# Patient Record
Sex: FEMALE | Race: WHITE | HISPANIC OR LATINO | Employment: UNEMPLOYED | URBAN - METROPOLITAN AREA
[De-identification: names, ages, dates, MRNs, and addresses within clinical notes are randomized per-mention and may not be internally consistent; named-entity substitution may affect disease eponyms.]

---

## 2018-11-07 ENCOUNTER — OFFICE VISIT (OUTPATIENT)
Dept: FAMILY MEDICINE CLINIC | Facility: CLINIC | Age: 33
End: 2018-11-07
Payer: COMMERCIAL

## 2018-11-07 VITALS
OXYGEN SATURATION: 95 % | DIASTOLIC BLOOD PRESSURE: 60 MMHG | RESPIRATION RATE: 18 BRPM | WEIGHT: 194 LBS | SYSTOLIC BLOOD PRESSURE: 118 MMHG | TEMPERATURE: 97.1 F | HEART RATE: 100 BPM | HEIGHT: 66 IN | BODY MASS INDEX: 31.18 KG/M2

## 2018-11-07 DIAGNOSIS — S66.911A MUSCLE STRAIN OF RIGHT WRIST, INITIAL ENCOUNTER: Primary | ICD-10-CM

## 2018-11-07 PROCEDURE — 3008F BODY MASS INDEX DOCD: CPT | Performed by: PHYSICIAN ASSISTANT

## 2018-11-07 PROCEDURE — 99203 OFFICE O/P NEW LOW 30 MIN: CPT | Performed by: PHYSICIAN ASSISTANT

## 2018-11-07 PROCEDURE — 3725F SCREEN DEPRESSION PERFORMED: CPT | Performed by: PHYSICIAN ASSISTANT

## 2018-11-07 RX ORDER — IBUPROFEN 800 MG/1
800 TABLET ORAL EVERY 6 HOURS PRN
Qty: 60 TABLET | Refills: 0 | Status: SHIPPED | OUTPATIENT
Start: 2018-11-07
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
## 2018-11-07 NOTE — PATIENT INSTRUCTIONS
Wrist Sprain   WHAT YOU NEED TO KNOW:   What is a wrist sprain? A wrist sprain happens when one or more ligaments in your wrist stretch or tear  Ligaments are tough tissues that connect bones and keep them in place, and support your joints  A fall onto your outstretched hand can cause a wrist sprain  An injury that causes your wrist to twist can also cause a sprain  This may happen during sports, such as biking, skiing, or snowboarding  What are the signs and symptoms of a wrist sprain? · Swelling and tenderness    · Pain and stiffness     · Bruising or changes in skin color    · Popping sound in your wrist when you move it  How is a wrist sprain diagnosed? Your healthcare provider will ask how you injured your wrist  The provider will examine your wrist and hand and ask about your symptoms  You may need x-rays, an MRI, or a CT scan of your wrist  You may be given contrast liquid to help the pictures show up better  Tell the healthcare provider if you have ever had an allergic reaction to contrast liquid  Do not enter the MRI room with anything metal  Metal can cause serious injury  Tell the healthcare provider if you have any metal in or on your body  How is a wrist sprain treated? Treatment depends on how severe your sprain is  You may need any of the following:  · NSAIDs , such as ibuprofen, help decrease swelling, pain, and fever  NSAIDs can cause stomach bleeding or kidney problems in certain people  If you take blood thinner medicine, always ask your healthcare provider if NSAIDs are safe for you  Always read the medicine label and follow directions  · Acetaminophen  decreases pain and fever  It is available without a doctor's order  Ask how much to take and how often to take it  Follow directions  Read the labels of all other medicines you are using to see if they also contain acetaminophen, or ask your doctor or pharmacist  Acetaminophen can cause liver damage if not taken correctly   Do not use more than 4 grams (4,000 milligrams) total of acetaminophen in one day  · A splint or cast  helps support your wrist and prevent more damage  · Surgery  may be needed if you have a severe sprain  Arthroscopy may be done to examine the inside of your wrist joint and repair ligament damage  Arthroscopy uses a scope that is inserted through a small incision  You may need open surgery to reconnect torn ligaments to the bone  · Physical therapy  may be recommended  A physical therapist teaches you exercises to help improve movement and strength, and to decrease pain  How can I manage my symptoms? · Rest  your wrist for at least 48 hours  Avoid activities that cause pain  · Ice  your wrist for 15 to 20 minutes every hour or as directed  Use an ice pack, or put crushed ice in a plastic bag  Cover it with a towel before you put it on your wrist  Ice helps prevent tissue damage and decreases swelling and pain  · Compress  your wrist with an elastic bandage  This will help decrease swelling, support your wrist, and help it heal  Wear your wrist wrap as directed  The elastic bandage should be snug but not tight  · Elevate  your wrist above the level of your heart as often as you can  This will help decrease swelling and pain  Prop your wrist on pillows or blankets to keep it elevated comfortably  When should I seek immediate care? · You have severe pain or swelling  · Your injured wrist is red or has red streaks spreading from the injured area  · You have new trouble moving your hands, fingers, or wrist     · Your wrist, hand, or fingers feel cold or numb  · Your fingernails turn blue or gray  When should I contact my healthcare provider? · Your symptoms get worse  · Your sprain does not get better within 2 weeks  · You have questions or concerns about your condition or care  CARE AGREEMENT:   You have the right to help plan your care   Learn about your health condition and how it may be treated  Discuss treatment options with your caregivers to decide what care you want to receive  You always have the right to refuse treatment  The above information is an  only  It is not intended as medical advice for individual conditions or treatments  Talk to your doctor, nurse or pharmacist before following any medical regimen to see if it is safe and effective for you  © 2017 [employer]  Information is for End User's use only and may not be sold, redistributed or otherwise used for commercial purposes  All illustrations and images included in CareNotes® are the copyrighted property of A D A M [employer] Inc  or [employer]ir [employer]

## 2021-02-13 ENCOUNTER — HOSPITAL ENCOUNTER (EMERGENCY)
Facility: HOSPITAL | Age: 36
Discharge: HOME | End: 2021-02-13
Attending: EMERGENCY MEDICINE | Admitting: EMERGENCY MEDICINE
Payer: COMMERCIAL

## 2021-02-13 VITALS
RESPIRATION RATE: 18 BRPM | HEART RATE: 61 BPM | DIASTOLIC BLOOD PRESSURE: 64 MMHG | SYSTOLIC BLOOD PRESSURE: 132 MMHG | OXYGEN SATURATION: 100 % | TEMPERATURE: 97.4 F

## 2021-02-13 DIAGNOSIS — K08.89 PAIN, DENTAL: Primary | ICD-10-CM

## 2021-02-13 PROCEDURE — 96372 THER/PROPH/DIAG INJ SC/IM: CPT

## 2021-02-13 PROCEDURE — 99282 EMERGENCY DEPT VISIT SF MDM: CPT

## 2021-02-13 PROCEDURE — 99284 EMERGENCY DEPT VISIT MOD MDM: CPT | Performed by: EMERGENCY MEDICINE

## 2021-02-13 RX ORDER — KETOROLAC TROMETHAMINE 30 MG/ML
15 INJECTION, SOLUTION INTRAMUSCULAR; INTRAVENOUS ONCE
Status: COMPLETED | OUTPATIENT
Start: 2021-02-13 | End: 2021-02-13

## 2021-02-13 RX ORDER — PENICILLIN V POTASSIUM 500 MG/1
500 TABLET ORAL 4 TIMES DAILY
Qty: 28 TABLET | Refills: 0 | Status: SHIPPED | OUTPATIENT
Start: 2021-02-13 | End: 2021-02-20

## 2021-02-13 RX ORDER — CHLORHEXIDINE GLUCONATE 0.12 MG/ML
15 RINSE ORAL 2 TIMES DAILY
Qty: 120 ML | Refills: 0 | Status: SHIPPED | OUTPATIENT
Start: 2021-02-13

## 2021-02-13 RX ADMIN — KETOROLAC TROMETHAMINE 15 MG: 30 INJECTION, SOLUTION INTRAMUSCULAR at 05:48

## 2021-02-13 NOTE — Clinical Note
Adma Rios was seen and treated in our emergency department on 2/13/2021  Diagnosis:     Belen Reeves  may return to work on return date  She may return on this date: 02/15/2021         If you have any questions or concerns, please don't hesitate to call        Sharri Alvarez MD    ______________________________           _______________          _______________  Hospital Representative                              Date                                Time

## 2021-02-13 NOTE — ED ATTENDING ATTESTATION
Final Diagnosis:  1  Pain, dental           I, Mary Sin MD, saw and evaluated the patient  All available labs and X-rays were ordered by me or the resident and have been reviewed by myself  I discussed the patient with the resident / non-physician and agree with the resident's / non-physician practitioner's findings and plan as documented in the resident's / non-physician practicitioner's note, except where noted  At this point, I agree with the current assessment done in the ED  I was present during key portions of all procedures performed unless otherwise stated  Chief Complaint   Patient presents with    Dental Pain     tooth ache for a few days ago, worse tonight, upper left     This is a 30 y o  female presenting for evaluation of dental pain located around tooth 15/16  Motrin helps minimally  No f/ch/n/v/cp/sob  No trismus  Pain worse tonight so came in  Eating/drinking okay  Came in for T3 for pain  PMH:   has a past medical history of Anemia and Hypertension  PSH:   has a past surgical history that includes Lymph node biopsy;  section; and Tubal ligation  Social:  Social History     Substance and Sexual Activity   Alcohol Use Yes    Comment: Social     Social History     Tobacco Use   Smoking Status Current Every Day Smoker    Packs/day: 1 00   Smokeless Tobacco Never Used     Social History     Substance and Sexual Activity   Drug Use Yes    Types: Marijuana     PE:  Vitals:    21 0513   Pulse: 61   Resp: 18   Temp: (!) 97 4 °F (36 3 °C)   TempSrc: Oral   SpO2: 100%   General: VS reviewed  Appears in NAD  awake, alert  Well-nourished, well-developed  Appears stated age  Speaking normally in full sentences  Head: Normocephalic, atraumatic  Eyes: EOM-I  No diplopia  No hyphema  No subconjunctival hemorrhages  Symmetrical lids  ENT: Atraumatic external nose and ears  MMM  Minimal LEFT facial swelling  Obvious cracked tooth, around tooth 15  Tooth 16 extracted  Percussive tenderness present  +ginigivitis (mild)  No trismus  No foul smell but I am wearing an n95 mask  No malocclusion  No stridor  Normal phonation  No drooling  Normal swallowing  Neck: No JVD  CV: No pallor noted  Lungs:   No tachypnea  No respiratory distress  MSK:   FROM spontaneously  Skin: Dry, intact  Neuro: Awake, alert, GCS15, CN II-XII grossly intact  Motor grossly intact  Psychiatric/Behavioral: Appropriate mood and affect   Exam: deferred  A:  - Dentalgia  P:  - We will give tylenol/motrin for pain  - discussed orajel  - We will write for chlorhexidine to sterilize the area and prevent further worsening of infection or repeat infection   - Given the appearance, we will also do antibiotics:  [ x ] PCN 500mg QID  [    ] Amoxicillin 20-40mg/kg/day TID  [    ] Augmentin 20mg/kg BID  [    ] Clindamycin (PCN allergy or worsening infection after 72 hours of amoxicillin) 8-20mg/kg/day TID  [    ] Azithromycin (alternative to clindamycin) 5-12mg/kg Qday  [    ] Metronidazole (30mg/kg/day QID) + Amoxicillin if suspected resistant infection  - The patient will follow-up with her primary care or dentist for re-evaluation of her symptoms   - The patient has no red flags for Darrian's or serious dental infection at this juncture  The patient doesn't appear toxic, nor has rapid progression of symptoms  Patient isn't immunocompromised  Patient has no SOB nor trouble swallowing  Patient doesn't appear dehydrated nor demonstrates trismus on exam    - 13 point ROS was performed and all are normal unless stated in the history above  - Nursing note reviewed  Vitals reviewed  - Orders placed by myself and/or advanced practitioner / resident     - Previous chart was reviewed  - No language barrier    - History obtained from patient  - There are no limitations to the history obtained  - Critical care time: Not applicable for this patient       Code Status: No Order  Advance Directive and Living Will:      Power of :    POLST:      Medications   ketorolac (TORADOL) injection 15 mg (has no administration in time range)     No orders to display     No orders of the defined types were placed in this encounter  Labs Reviewed - No data to display  Time reflects when diagnosis was documented in both MDM as applicable and the Disposition within this note     Time User Action Codes Description Comment    2/13/2021  5:33 AM Manoj Chauhan Add [K08 89] Pain, dental       ED Disposition     ED Disposition Condition Date/Time Comment    Discharge Stable Sat Feb 13, 2021  5:33 AM Almon Border discharge to home/self care  Follow-up Information     Follow up With Specialties Details Why Contact Info Additional Information    [address] [place] [address] 34 [facility] Emergency Department Emergency Medicine   [address]  [place] [address]  95 [facility] 64 [facility] Emergency Department, [address], [place], [place], [address]   [phone]        Patient's Medications   Discharge Prescriptions    CHLORHEXIDINE (PERIDEX) 0 12 % SOLUTION    Apply 15 mL to the mouth or throat 2 (two) times a day       Start Date: 2/13/2021 End Date: --       Order Dose: 15 mL       Quantity: 120 mL    Refills: 0    PENICILLIN V POTASSIUM (VEETID) 500 MG TABLET    Take 1 tablet (500 mg total) by mouth 4 (four) times a day for 7 days       Start Date: 2/13/2021 End Date: 2/20/2021       Order Dose: 500 mg       Quantity: 28 tablet    Refills: 0     No discharge procedures on file  Prior to Admission Medications   Prescriptions Last Dose Informant Patient Reported? Taking?   ibuprofen (MOTRIN) 800 mg tablet   No No   Sig: Take 1 tablet (800 mg total) by mouth every 6 (six) hours as needed for mild pain      Facility-Administered Medications: None       Portions of the record may have been created with voice recognition software   Occasional wrong word or "sound a like" substitutions may have occurred due to the inherent limitations of voice recognition software  Read the chart carefully and recognize, using context, where substitutions have occurred      Electronically signed by:  Diana Moffett

## 2021-02-13 NOTE — ED PROVIDER NOTES
Final Diagnosis:  1  Pain, dental        Chief Complaint   Patient presents with    Dental Pain     tooth ache for a few days ago, worse tonight, upper left     HPI  Patient presents for evaluation of dental pain  Patient states that for the last 3 4 days she has had pain in her left upper molar  She states the pain has been getting progressively worsened now she feels like it radiates back into her ear  It is worse with palpation or chilling  Mildly improved with ibuprofen  Patient states that she has attempted to contact her dentist for removal but has been unable to do so  Patient denies any fever chills, difficulty handling oral secretions, change in voice, difficulty breathing     - No language barrier    - History obtained from patient  - There are no limitations to the history obtained  - Previous charting was reviewed    PMH:   has a past medical history of Anemia and Hypertension  PSH:   has a past surgical history that includes Lymph node biopsy;  section; and Tubal ligation  ROS:  Review of Systems   Constitutional: Negative for chills, diaphoresis, fatigue and fever  HENT: Positive for dental problem  Negative for drooling, facial swelling, mouth sores, sore throat, trouble swallowing and voice change  Respiratory: Negative for cough and shortness of breath  Cardiovascular: Negative for chest pain and palpitations  Gastrointestinal: Negative for abdominal distention, abdominal pain, constipation, diarrhea, nausea and vomiting  Genitourinary: Negative for dysuria, frequency and hematuria  Musculoskeletal: Negative for arthralgias, myalgias and neck pain  Neurological: Negative for dizziness, syncope, light-headedness and headaches  All other systems reviewed and are negative  PE:   Vitals:    21 0513   Pulse: 61   Resp: 18   Temp: (!) 97 4 °F (36 3 °C)   TempSrc: Oral   SpO2: 100%     Vitals reviewed by me  Physical Exam  Vitals signs reviewed  Constitutional:       General: She is not in acute distress  Appearance: She is not diaphoretic  HENT:      Head: Normocephalic and atraumatic  Right Ear: External ear normal       Left Ear: External ear normal       Mouth/Throat:      Mouth: Mucous membranes are moist       Dentition: Abnormal dentition  Dental caries present  No gum lesions  Tongue: No lesions  Eyes:      General: No scleral icterus  Right eye: No discharge  Left eye: No discharge  Conjunctiva/sclera: Conjunctivae normal       Pupils: Pupils are equal, round, and reactive to light  Neck:      Musculoskeletal: Normal range of motion and neck supple  Vascular: No JVD  Cardiovascular:      Rate and Rhythm: Normal rate and regular rhythm  Heart sounds: Normal heart sounds  No murmur  No friction rub  No gallop  Pulmonary:      Effort: Pulmonary effort is normal  No respiratory distress  Breath sounds: Normal breath sounds  No wheezing or rales  Abdominal:      General: Bowel sounds are normal  There is no distension  Palpations: Abdomen is soft  There is no mass  Tenderness: There is no abdominal tenderness  There is no guarding  Musculoskeletal: Normal range of motion  General: No tenderness or deformity  Skin:     General: Skin is warm  Neurological:      Mental Status: She is alert and oriented to person, place, and time  Cranial Nerves: No cranial nerve deficit  Sensory: No sensory deficit  Motor: No abnormal muscle tone  Coordination: Coordination normal    Psychiatric:         Behavior: Behavior normal          Thought Content: Thought content normal          Judgment: Judgment normal           A:  - Nursing note reviewed  -                      No orders to display     No orders of the defined types were placed in this encounter  Labs Reviewed - No data to display      Final Diagnosis:  1   Pain, dental        P:  - patient with damaged molar  Discussed that she needs have this taking care of with a dentist   Provided antibiotics, Toradol, chlorhexidine, and information for walk-in dental clinic  -return precautions discussed    Medications   ketorolac (TORADOL) injection 15 mg (15 mg Intramuscular Given 2/13/21 0548)     Time reflects when diagnosis was documented in both MDM as applicable and the Disposition within this note     Time User Action Codes Description Comment    2/13/2021  5:33 AM Davina Schaeffer Add [K08 89] Pain, dental       ED Disposition     ED Disposition Condition Date/Time Comment    Discharge Stable Sat Feb 13, 2021  5:33 AM Tiffany Stewart discharge to home/self care  Follow-up Information     Follow up With Specialties Details Why Contact Info Additional Information    [address] [place] [address] Emergency Department Emergency Medicine   [address]  [place] [id number]  [address] [place] [address] Emergency Department, [address], [place], [address]   [phone]        Patient's Medications   Discharge Prescriptions    CHLORHEXIDINE (PERIDEX) 0 12 % SOLUTION    Apply 15 mL to the mouth or throat 2 (two) times a day       Start Date: 2/13/2021 End Date: --       Order Dose: 15 mL       Quantity: 120 mL    Refills: 0    PENICILLIN V POTASSIUM (VEETID) 500 MG TABLET    Take 1 tablet (500 mg total) by mouth 4 (four) times a day for 7 days       Start Date: 2/13/2021 End Date: 2/20/2021       Order Dose: 500 mg       Quantity: 28 tablet    Refills: 0     No discharge procedures on file  Prior to Admission Medications   Prescriptions Last Dose Informant Patient Reported? Taking?   ibuprofen (MOTRIN) 800 mg tablet   No No   Sig: Take 1 tablet (800 mg total) by mouth every 6 (six) hours as needed for mild pain      Facility-Administered Medications: None       Portions of the record may have been created with voice recognition software   Occasional wrong word or "sound a like" substitutions may have occurred due to the inherent limitations of voice recognition software  Read the chart carefully and recognize, using context, where substitutions have occurred      Electronically signed by:  Sergey Knight, PGY 3, MD Heather Sarmiento MD  02/13/21 5861

## 2021-02-13 NOTE — DISCHARGE INSTRUCTIONS
Consider using Orajel for dental pain    Please use the following pain medications as prescribed:  - Tylenol 650mg every 6 hours  - Motrin 400mg every 6 hours  - Orajel OTC  They work in different ways so can be used together at the same time  We will give a script for peridex wash  This is an anti-septic which will sterilize the area and prevent and infection from occurring  This can only be used for 2-3 weeks  If you use it longer, it might cause some pink staining of your teeth  It works like a super powered form of Listerine  Lastly, I am starting you on antibiotics  I would like you to take the full course as prescribed  This should help the pain and swelling  If despite the above you have worsening symptoms please return to the emergency room for re-evaluation  If you HAVE A DENTIST, please call to make an appointment with them for follow-up as soon as possible  If you DO NOT have a dentist, please call [phone]  It is a service that will help you find a dentist in your area and based on your insurance (if you have it or not) after you answer some questions    You may also follow-up with the [facility] if you don't have a density  [address] [facility] [address]  [address]  [address]  [phone]  They will see you with or without insurance and have walk in hours in the morning without appointments

## 2023-02-13 ENCOUNTER — VBI (OUTPATIENT)
Dept: ADMINISTRATIVE | Facility: OTHER | Age: 38
End: 2023-02-13

## 2023-09-20 ENCOUNTER — VBI (OUTPATIENT)
Dept: ADMINISTRATIVE | Facility: OTHER | Age: 38
End: 2023-09-20

## 2024-04-11 ENCOUNTER — TELEPHONE (OUTPATIENT)
Dept: PSYCHIATRY | Facility: CLINIC | Age: 39
End: 2024-04-11

## 2024-04-11 NOTE — TELEPHONE ENCOUNTER
Patient has been added to the [facility] wait list without a referral.    Insurance: [employer]  Insurance Type:    Commercial []   Medicaid [x]   [facility] (if applicable)   Medicare []  Location Preference: [place]  Provider Preference: male or female  Virtual: Yes [x] No []  Were outside resources sent: Yes [x] No []

## 2024-10-11 ENCOUNTER — TELEPHONE (OUTPATIENT)
Age: 39
End: 2024-10-11

## 2025-07-05 ENCOUNTER — HOSPITAL ENCOUNTER (EMERGENCY)
Facility: HOSPITAL | Age: 40
Discharge: HOME | End: 2025-07-05
Attending: EMERGENCY MEDICINE | Admitting: EMERGENCY MEDICINE
Payer: COMMERCIAL

## 2025-07-05 VITALS
DIASTOLIC BLOOD PRESSURE: 70 MMHG | SYSTOLIC BLOOD PRESSURE: 126 MMHG | BODY MASS INDEX: 34.16 KG/M2 | RESPIRATION RATE: 18 BRPM | HEIGHT: 65 IN | WEIGHT: 205 LBS | TEMPERATURE: 98 F | HEART RATE: 70 BPM | OXYGEN SATURATION: 98 %

## 2025-07-05 DIAGNOSIS — R82.81 STERILE PYURIA: ICD-10-CM

## 2025-07-05 DIAGNOSIS — R30.0 DYSURIA: Primary | ICD-10-CM

## 2025-07-05 DIAGNOSIS — N39.0 UTI (URINARY TRACT INFECTION): ICD-10-CM

## 2025-07-05 LAB
ALBUMIN SERPL BCG-MCNC: 4.1 G/DL (ref 3.5–5)
ALP SERPL-CCNC: 67 U/L (ref 34–104)
ALT SERPL W P-5'-P-CCNC: 9 U/L (ref 7–52)
ANION GAP SERPL CALCULATED.3IONS-SCNC: 6 MMOL/L (ref 4–13)
AST SERPL W P-5'-P-CCNC: 15 U/L (ref 13–39)
BACTERIA UR QL AUTO: ABNORMAL /HPF
BASOPHILS # BLD AUTO: 0.08 THOUSANDS/ÂΜL (ref 0–0.1)
BASOPHILS NFR BLD AUTO: 1 % (ref 0–1)
BILIRUB SERPL-MCNC: 0.29 MG/DL (ref 0.2–1)
BILIRUB UR QL STRIP: NEGATIVE
BUN SERPL-MCNC: 17 MG/DL (ref 5–25)
CALCIUM SERPL-MCNC: 9.3 MG/DL (ref 8.4–10.2)
CHLORIDE SERPL-SCNC: 108 MMOL/L (ref 96–108)
CLARITY UR: ABNORMAL
CO2 SERPL-SCNC: 26 MMOL/L (ref 21–32)
COLOR UR: YELLOW
CREAT SERPL-MCNC: 0.89 MG/DL (ref 0.6–1.3)
EOSINOPHIL # BLD AUTO: 0.72 THOUSAND/ÂΜL (ref 0–0.61)
EOSINOPHIL NFR BLD AUTO: 6 % (ref 0–6)
ERYTHROCYTE [DISTWIDTH] IN BLOOD BY AUTOMATED COUNT: 13.2 % (ref 11.6–15.1)
EXT PREGNANCY TEST URINE: NEGATIVE
EXT. CONTROL: NORMAL
GFR SERPL CREATININE-BSD FRML MDRD: 82 ML/MIN/1.73SQ M
GLUCOSE SERPL-MCNC: 87 MG/DL (ref 65–140)
GLUCOSE UR STRIP-MCNC: NEGATIVE MG/DL
HCT VFR BLD AUTO: 39.6 % (ref 34.8–46.1)
HGB BLD-MCNC: 12.7 G/DL (ref 11.5–15.4)
HGB UR QL STRIP.AUTO: ABNORMAL
IMM GRANULOCYTES # BLD AUTO: 0.03 THOUSAND/UL (ref 0–0.2)
IMM GRANULOCYTES NFR BLD AUTO: 0 % (ref 0–2)
KETONES UR STRIP-MCNC: NEGATIVE MG/DL
LEUKOCYTE ESTERASE UR QL STRIP: ABNORMAL
LIPASE SERPL-CCNC: 31 U/L (ref 11–82)
LYMPHOCYTES # BLD AUTO: 2.61 THOUSANDS/ÂΜL (ref 0.6–4.47)
LYMPHOCYTES NFR BLD AUTO: 22 % (ref 14–44)
MCH RBC QN AUTO: 31.8 PG (ref 26.8–34.3)
MCHC RBC AUTO-ENTMCNC: 32.1 G/DL (ref 31.4–37.4)
MCV RBC AUTO: 99 FL (ref 82–98)
MONOCYTES # BLD AUTO: 0.89 THOUSAND/ÂΜL (ref 0.17–1.22)
MONOCYTES NFR BLD AUTO: 8 % (ref 4–12)
NEUTROPHILS # BLD AUTO: 7.44 THOUSANDS/ÂΜL (ref 1.85–7.62)
NEUTS SEG NFR BLD AUTO: 63 % (ref 43–75)
NITRITE UR QL STRIP: NEGATIVE
NON-SQ EPI CELLS URNS QL MICRO: ABNORMAL /HPF
NRBC BLD AUTO-RTO: 0 /100 WBCS
PH UR STRIP.AUTO: 7 [PH]
PLATELET # BLD AUTO: 241 THOUSANDS/UL (ref 149–390)
PMV BLD AUTO: 10.8 FL (ref 8.9–12.7)
POTASSIUM SERPL-SCNC: 4.1 MMOL/L (ref 3.5–5.3)
PROT SERPL-MCNC: 6.8 G/DL (ref 6.4–8.4)
PROT UR STRIP-MCNC: ABNORMAL MG/DL
RBC # BLD AUTO: 4 MILLION/UL (ref 3.81–5.12)
RBC #/AREA URNS AUTO: ABNORMAL /HPF
SODIUM SERPL-SCNC: 140 MMOL/L (ref 135–147)
SP GR UR STRIP.AUTO: 1.02 (ref 1–1.03)
UROBILINOGEN UR STRIP-ACNC: <2 MG/DL
WBC # BLD AUTO: 11.77 THOUSAND/UL (ref 4.31–10.16)
WBC #/AREA URNS AUTO: ABNORMAL /HPF

## 2025-07-05 PROCEDURE — 36415 COLL VENOUS BLD VENIPUNCTURE: CPT

## 2025-07-05 PROCEDURE — 96360 HYDRATION IV INFUSION INIT: CPT

## 2025-07-05 PROCEDURE — 81025 URINE PREGNANCY TEST: CPT

## 2025-07-05 PROCEDURE — 83690 ASSAY OF LIPASE: CPT

## 2025-07-05 PROCEDURE — 99284 EMERGENCY DEPT VISIT MOD MDM: CPT

## 2025-07-05 PROCEDURE — 87086 URINE CULTURE/COLONY COUNT: CPT

## 2025-07-05 PROCEDURE — 87591 N.GONORRHOEAE DNA AMP PROB: CPT

## 2025-07-05 PROCEDURE — 87077 CULTURE AEROBIC IDENTIFY: CPT

## 2025-07-05 PROCEDURE — 87491 CHLMYD TRACH DNA AMP PROBE: CPT

## 2025-07-05 PROCEDURE — 85025 COMPLETE CBC W/AUTO DIFF WBC: CPT

## 2025-07-05 PROCEDURE — 81001 URINALYSIS AUTO W/SCOPE: CPT

## 2025-07-05 PROCEDURE — 80053 COMPREHEN METABOLIC PANEL: CPT

## 2025-07-05 RX ADMIN — SODIUM CHLORIDE 1000 ML: 0.9 INJECTION, SOLUTION INTRAVENOUS at 21:12

## 2025-07-06 NOTE — ED NOTES
Patient left before this RN was able to collect Rapid HIV combo. Patient also appears to have left with her IV intact. No evidence of IV in room or in the trash. This RN, PA, and charge did not remove IV. Charge RN aware and speaking w/ security and registration.      Masha Mojica RN  07/05/25 4420

## 2025-07-06 NOTE — DISCHARGE INSTRUCTIONS
We will call you with any positive results, including urine and STI testing.    Please return to ED with worsening symptoms or new symptoms such as abdominal pain, nausea, vomiting, fevers, chills, pelvic pain, or hematuria.

## 2025-07-06 NOTE — ED PROVIDER NOTES
Time reflects when diagnosis was documented in both MDM as applicable and the Disposition within this note       Time User Action Codes Description Comment    7/5/2025 10:34 PM Brit Bearden Add [R30.0] Dysuria     7/5/2025 10:36 PM Brti Bearden Add [R82.81] Sterile pyuria           ED Disposition       ED Disposition   Left from Room after Provider Exam    Condition   --    Date/Time   Sat Jul 5, 2025 11:14 PM    Comment   --             Assessment & Plan       Medical Decision Making  39-year-old female presenting for evaluation of dysuria, frequency, intermittent left-sided low back pain for the past week.  On exam patient is well-appearing, vital signs stable, afebrile.  Heart regular rate and rhythm, lungs clear to auscultation bilaterally, abdomen soft and nontender to palpation.  No CVA tenderness on exam.      Differential diagnosis to include but not limited to: Urinary tract infection, yeast infection, STI, less likely nephrolithiasis.  First nurse labs reviewed, mild leukocytosis at 11.77.  UA with 30-50 RBC, innumerable WBC, although no bacteria.  I discussed these findings with the patient, feel that this is less likely a UTI she has no bacteria in her urine.  Discussed pyuria could have other etiologies such as STI. Doubt appendicitis or intra-abdominal pathology, but did discuss possibility of imaging which patient deferred at this time. Will add on STI testing including GC/chlamydia, molecular vaginal panel, and rapid HIV.    After discussion with patient, notified by RN that patient requested discharge. Patient eloped from ED without receiving paperwork, vaginal swab or rapid HIV.     Amount and/or Complexity of Data Reviewed  Labs: ordered. Decision-making details documented in ED Course.        ED Course as of 07/05/25 2314   Sat Jul 05, 2025 2145 WBC(!): 11.77   2145 Leukocytes, UA(!): Large       Medications   sodium chloride 0.9 % bolus 1,000 mL (0 mL Intravenous Stopped 7/5/25 2212)        ED Risk Strat Scores                    No data recorded        SBIRT 22yo+      Flowsheet Row Most Recent Value   Initial Alcohol Screen: US AUDIT-C     1. How often do you have a drink containing alcohol? 0 Filed at: 07/05/2025 2040   2. How many drinks containing alcohol do you have on a typical day you are drinking?  0 Filed at: 07/05/2025 2040   3b. FEMALE Any Age, or MALE 65+: How often do you have 4 or more drinks on one occassion? 0 Filed at: 07/05/2025 2040   Audit-C Score 0 Filed at: 07/05/2025 2040   JHONNY: How many times in the past year have you...    Used an illegal drug or used a prescription medication for non-medical reasons? Never Filed at: 07/05/2025 2040                            History of Present Illness       Chief Complaint   Patient presents with    Possible UTI     Last week began with cramping with urination and flank pain. Also reporting frequency and oliguria. Pt took 4 days of some antibiotic and 4 days of vaginal suppositiories. Denies fevers/chills.        Past Medical History[1]   Past Surgical History[2]   Family History[3]   Social History[4]   E-Cigarette/Vaping      E-Cigarette/Vaping Substances      I have reviewed and agree with the history as documented.     Patient is a 39-year-old female with past medical history of PTSD, chronic back pain presenting for evaluation of dysuria for the past week.  Patient states she has discomfort when urinating as well as a cramping sensation at her urethra.  Reporting frequency as well.  Also reports low back pain, but states this is recurrent for her.  Took 4 days of an unknown antibiotic vaginal suppositories at home.  Denies fevers, chills, chest pain, shortness of breath, abdominal pain, nausea, vomiting, vaginal bleeding, vaginal discharge, or itching.          Review of Systems   Constitutional:  Negative for chills and fever.   HENT:  Negative for congestion and rhinorrhea.    Respiratory:  Negative for cough and shortness of  breath.    Cardiovascular:  Negative for chest pain and palpitations.   Gastrointestinal:  Negative for abdominal pain, nausea and vomiting.   Genitourinary:  Positive for dysuria and frequency. Negative for difficulty urinating, pelvic pain, urgency and vaginal discharge.   Musculoskeletal:  Positive for back pain.           Objective       ED Triage Vitals [07/05/25 2037]   Temperature Pulse Blood Pressure Respirations SpO2 Patient Position - Orthostatic VS   98 °F (36.7 °C) 75 127/60 18 97 % Sitting      Temp Source Heart Rate Source BP Location FiO2 (%) Pain Score    Oral Monitor Right arm -- --      Vitals      Date and Time Temp Pulse SpO2 Resp BP Pain Score FACES Pain Rating User   07/05/25 2110 -- 70 98 % 18 126/70 -- -- SB   07/05/25 2037 98 °F (36.7 °C) 75 97 % 18 127/60 -- -- C(S            Physical Exam  Vitals and nursing note reviewed.   Constitutional:       General: She is not in acute distress.     Appearance: She is well-developed. She is not ill-appearing.   HENT:      Head: Normocephalic and atraumatic.     Eyes:      Conjunctiva/sclera: Conjunctivae normal.       Cardiovascular:      Rate and Rhythm: Normal rate and regular rhythm.      Heart sounds: No murmur heard.  Pulmonary:      Effort: Pulmonary effort is normal. No respiratory distress.      Breath sounds: Normal breath sounds.   Abdominal:      General: Abdomen is flat. There is no distension.      Palpations: Abdomen is soft.      Tenderness: There is no abdominal tenderness. There is no right CVA tenderness, left CVA tenderness or guarding.     Musculoskeletal:         General: No swelling.      Cervical back: Neck supple.        Back:      Skin:     General: Skin is warm and dry.      Capillary Refill: Capillary refill takes less than 2 seconds.     Neurological:      Mental Status: She is alert.     Psychiatric:         Mood and Affect: Mood normal.         Results Reviewed       Procedure Component Value Units Date/Time     Chlamydia/GC amplified DNA by PCR [[id number]] Collected: 07/05/25 2225    Lab Status: In process Updated: 07/05/25 2225    Molecular Vaginal Panel [[id number]]     Lab Status: No result Specimen: Genital from Vaginal     Comprehensive metabolic panel [[id number]] Collected: 07/05/25 2109    Lab Status: Final result Specimen: Blood from Arm, Right Updated: 07/05/25 2142     Sodium 140 mmol/L      Potassium 4.1 mmol/L      Chloride 108 mmol/L      CO2 26 mmol/L      ANION GAP 6 mmol/L      BUN 17 mg/dL      Creatinine 0.89 mg/dL      Glucose 87 mg/dL      Calcium 9.3 mg/dL      AST 15 U/L      ALT 9 U/L      Alkaline Phosphatase 67 U/L      Total Protein 6.8 g/dL      Albumin 4.1 g/dL      Total Bilirubin 0.29 mg/dL      eGFR 82 ml/min/1.73sq m     Narrative:      [employer] guidelines for Chronic Kidney Disease (CKD):     Stage 1 with normal or high GFR (GFR > 90 mL/min/1.73 square meters)    Stage 2 Mild CKD (GFR = 60-89 mL/min/1.73 square meters)    Stage 3A Moderate CKD (GFR = 45-59 mL/min/1.73 square meters)    Stage 3B Moderate CKD (GFR = 30-44 mL/min/1.73 square meters)    Stage 4 Severe CKD (GFR = 15-29 mL/min/1.73 square meters)    Stage 5 End Stage CKD (GFR <15 mL/min/1.73 square meters)  Note: GFR calculation is accurate only with a steady state creatinine    Lipase [[id number]]  (Normal) Collected: 07/05/25 2109    Lab Status: Final result Specimen: Blood from Arm, Right Updated: 07/05/25 2142     Lipase 31 u/L     Urine Microscopic [[id number]]  (Abnormal) Collected: 07/05/25 2049    Lab Status: Final result Specimen: Urine, Clean Catch Updated: 07/05/25 2118     RBC, UA 30-50 /hpf      WBC, UA Innumerable /hpf      Epithelial Cells None Seen /hpf      Bacteria, UA None Seen /hpf     Urine culture [[id number]] Collected: 07/05/25 2049    Lab Status: In process Specimen: Urine, Clean Catch Updated: 07/05/25 2118    CBC and differential [[id number]]  (Abnormal) Collected: 07/05/25 9615     Lab Status: Final result Specimen: Blood from Arm, Right Updated: 07/05/25 2115     WBC 11.77 Thousand/uL      RBC 4.00 Million/uL      Hemoglobin 12.7 g/dL      Hematocrit 39.6 %      MCV 99 fL      MCH 31.8 pg      MCHC 32.1 g/dL      RDW 13.2 %      MPV 10.8 fL      Platelets 241 Thousands/uL      nRBC 0 /100 WBCs      Segmented % 63 %      Immature Grans % 0 %      Lymphocytes % 22 %      Monocytes % 8 %      Eosinophils Relative 6 %      Basophils Relative 1 %      Absolute Neutrophils 7.44 Thousands/µL      Absolute Immature Grans 0.03 Thousand/uL      Absolute Lymphocytes 2.61 Thousands/µL      Absolute Monocytes 0.89 Thousand/µL      Eosinophils Absolute 0.72 Thousand/µL      Basophils Absolute 0.08 Thousands/µL     UA w Reflex to Microscopic w Reflex to Culture [[id number]]  (Abnormal) Collected: 07/05/25 2049    Lab Status: Final result Specimen: Urine, Clean Catch Updated: 07/05/25 2105     Color, UA Yellow     Clarity, UA Turbid     Specific Gravity, UA 1.022     pH, UA 7.0     Leukocytes, UA Large     Nitrite, UA Negative     Protein, UA Trace mg/dl      Glucose, UA Negative mg/dl      Ketones, UA Negative mg/dl      Urobilinogen, UA <2.0 mg/dl      Bilirubin, UA Negative     Occult Blood, UA Moderate    POCT pregnancy, urine [[id number]]  (Normal) Collected: 07/05/25 2057    Lab Status: Final result Specimen: Urine Updated: 07/05/25 2057     EXT Preg Test, Ur Negative     Control Valid            No orders to display       Procedures    ED Medication and Procedure Management   Prior to Admission Medications   Prescriptions Last Dose Informant Patient Reported? Taking?   chlorhexidine (PERIDEX) 0.12 % solution   No No   Sig: Apply 15 mL to the mouth or throat 2 (two) times a day   ibuprofen (MOTRIN) 800 mg tablet   No No   Sig: Take 1 tablet (800 mg total) by mouth every 6 (six) hours as needed for mild pain      Facility-Administered Medications: None     Patient's Medications   Discharge Prescriptions     No medications on file     No discharge procedures on file.  ED SEPSIS DOCUMENTATION   Time reflects when diagnosis was documented in both MDM as applicable and the Disposition within this note       Time User Action Codes Description Comment    2025 10:34 PM Brit Bearden Add [R30.0] Dysuria     2025 10:36 PM Brit Bearden Add [R82.81] Sterile pyuria                      [1]   Past Medical History:  Diagnosis Date    Anemia     Last assessed 2013    Hypertension    [2]   Past Surgical History:  Procedure Laterality Date     SECTION      LYMPH NODE BIOPSY      TUBAL LIGATION     [3]   Family History  Problem Relation Name Age of Onset    Arthritis Mother      Lupus Mother      Cervical cancer Family Unknown     Diabetes Family Unknown     Cancer Family Unknown     Liver cancer Family Unknown    [4]   Social History  Tobacco Use    Smoking status: Every Day     Current packs/day: 1.00     Types: Cigarettes    Smokeless tobacco: Never   Substance Use Topics    Alcohol use: Yes     Comment: Social    Drug use: Yes     Types: Marijuana        Brit Bearden PA-C  [phone]

## 2025-07-07 LAB
BACTERIA UR CULT: ABNORMAL
C TRACH DNA SPEC QL NAA+PROBE: NEGATIVE
N GONORRHOEA DNA SPEC QL NAA+PROBE: NEGATIVE

## 2025-07-09 RX ORDER — NITROFURANTOIN 25; 75 MG/1; MG/1
100 CAPSULE ORAL 2 TIMES DAILY
Qty: 14 CAPSULE | Refills: 0 | Status: SHIPPED | OUTPATIENT
Start: 2025-07-09 | End: 2025-07-16

## 2025-08-07 ENCOUNTER — APPOINTMENT (OUTPATIENT)
Dept: LAB | Facility: HOSPITAL | Age: 40
End: 2025-08-07
Payer: COMMERCIAL

## 2025-08-07 ENCOUNTER — OFFICE VISIT (OUTPATIENT)
Dept: FAMILY MEDICINE CLINIC | Facility: CLINIC | Age: 40
End: 2025-08-07

## 2025-08-07 VITALS
HEIGHT: 65 IN | TEMPERATURE: 97.2 F | RESPIRATION RATE: 16 BRPM | BODY MASS INDEX: 33.84 KG/M2 | WEIGHT: 203.1 LBS | DIASTOLIC BLOOD PRESSURE: 70 MMHG | OXYGEN SATURATION: 97 % | HEART RATE: 75 BPM | SYSTOLIC BLOOD PRESSURE: 110 MMHG

## 2025-08-07 DIAGNOSIS — Z76.89 ENCOUNTER TO ESTABLISH CARE: ICD-10-CM

## 2025-08-07 DIAGNOSIS — E66.9 OBESITY (BMI 30-39.9): ICD-10-CM

## 2025-08-07 DIAGNOSIS — K21.9 GASTROESOPHAGEAL REFLUX DISEASE WITHOUT ESOPHAGITIS: ICD-10-CM

## 2025-08-07 DIAGNOSIS — Z11.1 SCREENING FOR TUBERCULOSIS: ICD-10-CM

## 2025-08-07 DIAGNOSIS — Z00.00 ANNUAL PHYSICAL EXAM: Primary | ICD-10-CM

## 2025-08-07 DIAGNOSIS — Z72.0 TOBACCO USE: ICD-10-CM

## 2025-08-07 PROCEDURE — 99214 OFFICE O/P EST MOD 30 MIN: CPT

## 2025-08-07 PROCEDURE — 99385 PREV VISIT NEW AGE 18-39: CPT

## 2025-08-07 PROCEDURE — 99406 BEHAV CHNG SMOKING 3-10 MIN: CPT

## 2025-08-07 RX ORDER — FAMOTIDINE 20 MG/1
20 TABLET, FILM COATED ORAL 2 TIMES DAILY
Qty: 180 TABLET | Refills: 0 | Status: SHIPPED | OUTPATIENT
Start: 2025-08-07 | End: 2026-08-02

## 2025-08-11 ENCOUNTER — RESULTS FOLLOW-UP (OUTPATIENT)
Dept: FAMILY MEDICINE CLINIC | Facility: CLINIC | Age: 40
End: 2025-08-11